# Patient Record
Sex: FEMALE | Race: WHITE | NOT HISPANIC OR LATINO | Employment: UNEMPLOYED | URBAN - METROPOLITAN AREA
[De-identification: names, ages, dates, MRNs, and addresses within clinical notes are randomized per-mention and may not be internally consistent; named-entity substitution may affect disease eponyms.]

---

## 2018-06-25 ENCOUNTER — APPOINTMENT (OUTPATIENT)
Dept: RADIOLOGY | Facility: IMAGING CENTER | Age: 23
End: 2018-06-25
Attending: PHYSICIAN ASSISTANT
Payer: COMMERCIAL

## 2018-06-25 ENCOUNTER — OCCUPATIONAL MEDICINE (OUTPATIENT)
Dept: URGENT CARE | Facility: CLINIC | Age: 23
End: 2018-06-25
Payer: COMMERCIAL

## 2018-06-25 VITALS
HEART RATE: 88 BPM | WEIGHT: 173 LBS | HEIGHT: 69 IN | BODY MASS INDEX: 25.62 KG/M2 | RESPIRATION RATE: 16 BRPM | OXYGEN SATURATION: 100 % | TEMPERATURE: 98.9 F | SYSTOLIC BLOOD PRESSURE: 128 MMHG | DIASTOLIC BLOOD PRESSURE: 84 MMHG

## 2018-06-25 DIAGNOSIS — S69.92XA INJURY OF LEFT HAND, INITIAL ENCOUNTER: ICD-10-CM

## 2018-06-25 DIAGNOSIS — S61.309A AVULSION OF FINGERNAIL, INITIAL ENCOUNTER: ICD-10-CM

## 2018-06-25 DIAGNOSIS — S63.259A DISLOCATION OF FINGER, INITIAL ENCOUNTER: ICD-10-CM

## 2018-06-25 DIAGNOSIS — R11.0 NAUSEA: ICD-10-CM

## 2018-06-25 DIAGNOSIS — S62.653A CLOSED NONDISPLACED FRACTURE OF MIDDLE PHALANX OF LEFT MIDDLE FINGER, INITIAL ENCOUNTER: ICD-10-CM

## 2018-06-25 PROCEDURE — 99203 OFFICE O/P NEW LOW 30 MIN: CPT | Mod: 25,29 | Performed by: PHYSICIAN ASSISTANT

## 2018-06-25 PROCEDURE — 73130 X-RAY EXAM OF HAND: CPT | Mod: TC,LT,29 | Performed by: PHYSICIAN ASSISTANT

## 2018-06-25 PROCEDURE — 26770 TREAT FINGER DISLOCATION: CPT | Mod: 29,54,F1 | Performed by: PHYSICIAN ASSISTANT

## 2018-06-25 PROCEDURE — 73140 X-RAY EXAM OF FINGER(S): CPT | Mod: TC,LT,29 | Performed by: PHYSICIAN ASSISTANT

## 2018-06-25 RX ORDER — HYDROCODONE BITARTRATE AND ACETAMINOPHEN 5; 325 MG/1; MG/1
1 TABLET ORAL EVERY 8 HOURS PRN
Qty: 15 TAB | Refills: 0 | Status: SHIPPED | OUTPATIENT
Start: 2018-06-25 | End: 2018-06-30

## 2018-06-25 RX ORDER — ONDANSETRON 4 MG/1
4 TABLET, ORALLY DISINTEGRATING ORAL ONCE
Status: COMPLETED | OUTPATIENT
Start: 2018-06-25 | End: 2018-06-25

## 2018-06-25 RX ORDER — ACETAMINOPHEN 500 MG
1000 TABLET ORAL ONCE
Status: COMPLETED | OUTPATIENT
Start: 2018-06-25 | End: 2018-06-25

## 2018-06-25 RX ADMIN — ONDANSETRON 4 MG: 4 TABLET, ORALLY DISINTEGRATING ORAL at 16:39

## 2018-06-25 RX ADMIN — Medication 1000 MG: at 14:39

## 2018-06-25 NOTE — PROGRESS NOTES
"Subjective:      Stas Martinez is a 22 y.o. female who presents with Finger Injury (left index finger crush injury today )      DOI: 6/25/18. Left hand injury. Patient's hand was injured while using a torque on and bent her left index finger causing injury. There is an obvious deformity to her left index finger. There is a laceration at the ventral DIP joint. The nail of the left index finger has been avulsed. Significant tenderness and swelling. Pain extends into the left hand. Patient was evaluated by EMT at work, they splinted her and sent her here. No previous injury. No 2nd job. Tetanus UTD.     HPI    ROS    Medications, Allergies, and current problem list reviewed today in Epic     Objective:     /84   Pulse 88   Temp 37.2 °C (98.9 °F)   Resp 16   Ht 1.753 m (5' 9\")   Wt 78.5 kg (173 lb)   SpO2 100%   BMI 25.55 kg/m²      Physical Exam   Constitutional: She is oriented to person, place, and time. She appears well-developed and well-nourished. No distress.   HENT:   Head: Normocephalic and atraumatic.   Eyes: Conjunctivae are normal.   Neck: Normal range of motion. Neck supple.   Cardiovascular: Normal rate, regular rhythm and normal heart sounds.    Pulmonary/Chest: Effort normal and breath sounds normal. No respiratory distress. She has no wheezes.   Musculoskeletal: She exhibits edema, tenderness and deformity.   Neurological: She is alert and oriented to person, place, and time.   Skin: Skin is warm and dry. She is not diaphoretic.   Psychiatric: She has a normal mood and affect. Her behavior is normal. Judgment and thought content normal.   Nursing note and vitals reviewed.      There is an obvious deformity to her left index finger. There is a superficial laceration at the ventral DIP joint. The nail of the left index finger has been avulsed. Significant tenderness and swelling. Pain extends into the left hand. There is tenderness to the PIP joint of the third digit of the left hand.  Using " sterile technique the area was cleaned. Local anesthesia with 2% lidocaine without. Traction was applied and finger was reduced. Postreduction films showed good joint alignment. Range of motion was normal. Finger was then soaked, cleaned, clean dressings applied. Finger splints for the second and third digit.    Prereduction film: Dislocation of the DIP joint secondary to left hand. Avulsion fracture of the PIP joint third digit left hand.    Postreduction film: No further dislocation noted proper alignment.    Radiology review pending     Assessment/Plan:     1. Injury of left hand, initial encounter  DX-HAND 3+ LEFT    acetaminophen (TYLENOL) tablet 1,000 mg   2. Nausea  ondansetron (ZOFRAN ODT) dispertab 4 mg   3. Dislocation of finger, initial encounter  DX-FINGER(S) 2+ LEFT    HYDROcodone-acetaminophen (NORCO) 5-325 MG Tab per tablet    Consent for Opiate Prescription   4. Closed nondisplaced fracture of middle phalanx of left middle finger, initial encounter  HYDROcodone-acetaminophen (NORCO) 5-325 MG Tab per tablet    Consent for Opiate Prescription   5. Avulsion of fingernail, initial encounter  HYDROcodone-acetaminophen (NORCO) 5-325 MG Tab per tablet    Consent for Opiate Prescription     X-ray showed a dislocation of the second DIP joint. Finger was reduced. Postreduction films showed proper alignment. There is also a fracture of the third PIP joints.  Norco for breakthrough pain. Do not take at work. OTC meds for daytime  [facility] Aware web site evaluation: I have obtained and reviewed patient utilization report from [facility] pharmacy database prior to writing prescription for controlled substance II, III or IV. Based on the report and my clinical assessment the prescription is medically necessary.   Patient is cautioned on sedation potential of narcotic medication; no drinking, driving or operating heavy machinery while on this medication.  Wounds were cleaned, fresh dressings applied. Finger  splints  Tetanus up-to-date  Wound care discussed, handout given. Watch for infection  Follow-up 3 days  Work restrictions  Return to clinic or go to ED if symptoms worsen or persist. Indications for ED discussed at length. Patient voices understanding. Red flags discussed.All side effects of medication discussed including allergic response, GI upset, tendon injury, etc.      Please note that this dictation was created using voice recognition software. I have made every reasonable attempt to correct obvious errors, but I expect that there are errors of grammar and possibly content that I did not discover before finalizing the note.

## 2018-06-25 NOTE — LETTER
"EMPLOYEE’S CLAIM FOR COMPENSATION/ REPORT OF INITIAL TREATMENT  FORM C-4    EMPLOYEE’S CLAIM - PROVIDE ALL INFORMATION REQUESTED   First Name  Stas Last Name  Juan Birthdate                    1995                Sex  female Claim Number   Home Address  [address] Age  22 y.o. Height  1.753 m (5' 9\") Weight  78.5 kg (173 lb) [facility]     [place]  [address] Telephone  [phone] (home)    Mailing Address  [address] [place]  [address] Primary Language Spoken  English    Insurer   Third Party      Employee's Occupation (Job Title) When Injury or Occupational Disease Occurred      Employer's Name     Telephone      Employer Address    City    State    Zip      Date of Injury  6/25/2018               Hour of Injury  1:50 PM Date Employer Notified  6/25/2018 Last Day of Work after Injury or Occupational Disease  6/25/2018 Supervisor to Whom Injury Reported  King Ritter   Address or Location of Accident (if applicable)  [[address]]   What were you doing at the time of accident? (if applicable)  [employer]    How did this injury or occupational disease occur? (Be specific an answer in detail. Use additional sheet if necessary)  Work glove got caught in torgue gun drill   If you believe that you have an occupational disease, when did you first have knowledge of the disability and it relationship to your employment?  no Witnesses to the Accident  no      Nature of Injury or Occupational Disease  Workers' Compensation  Part(s) of Body Injured or Affected  Hand (L), ,     I certify that the above is true and correct to the best of my knowledge and that I have provided this information in order to obtain the benefits of [place]  I hereby " authorize any physician, chiropractor, surgeon, practitioner, or other person, any hospital, including [facility] or [facility], any medical service organization, any insurance company, or other institution or organization to release to each other, any medical or other information, including benefits paid or payable, pertinent to this injury or disease, except information relative to diagnosis, treatment and/or counseling for AIDS, psychological conditions, alcohol or controlled substances, for which I must give specific authorization.  A Photostat of this authorization shall be as valid as the original.     Date 06/25/2018   Place [facility] [place]   Employee’s Signature   THIS REPORT MUST BE COMPLETED AND MAILED WITHIN 3 WORKING DAYS OF TREATMENT   Place  [facility]  Name of Facility  [facility]   Date  6/25/2018 Diagnosis  (S69.92XA) Injury of left hand, initial encounter  (R11.0) Nausea  (S63.259A) Dislocation of finger, initial encounter  (S62.653A) Closed nondisplaced fracture of middle phalanx of left middle finger, initial encounter  (S61.309A) Avulsion of fingernail, initial encounter Is there evidence the injured employee was under the influence of alcohol and/or another controlled substance at the time of accident?   Hour  2:27 PM Description of Injury or Disease  Diagnoses of Injury of left hand, initial encounter, Nausea, Dislocation of finger, initial encounter, Closed nondisplaced fracture of middle phalanx of left middle finger, initial encounter, and Avulsion of fingernail, initial encounter were pertinent to this visit. No   Treatment  X-ray showed a dislocation of the second DIP joint. Finger was reduced. Postreduction films showed proper alignment. There is also a fracture of the third PIP joints.  Norco for breakthrough pain. Do not take at work. OTC meds for daytime  Wounds were cleaned, fresh dressings applied. Finger splints  Tetanus up-to-date  Wound  "care discussed, handout given. Watch for infection  Follow-up 3 days  Have you advised the patient to remain off work five days or more? No   X-Ray Findings  Positive  Comments:fracture of the third PIP joint. Dislocation of the second DIP joint.   If Yes   From Date  To Date      From information given by the employee, together with medical evidence, can you directly connect this injury or occupational disease as job incurred?  Yes If No Full Duty  No Modified Duty  Yes   Is additional medical care by a physician indicated?  Yes If Modified Duty, Specify any Limitations / Restrictions  No pushing or pulling with left hand  Weight limit 10 pounds left hand   Do you know of any previous injury or disease contributing to this condition or occupational disease?                            No   Date  6/25/2018 Print Doctor’s Name Tarik Nuñez P.A.-C. I certify the employer’s copy of  this form was mailed on:   [place]  [address] Insurer’s Use Only     [place] Zip  [address]    Provider’s Tax ID Number  [id number] Telephone  Dept: [phone]        e-TARIK Sims P.A.-C.   e-Signature: Dr. Carlos Manuel Ramos, Medical Director Degree  OL[employer]        ORIGINAL-TREATING PHYSICIAN OR CHIROPRACTOR    PAGE 2-INSURER/TPA    PAGE 3-EMPLOYER    PAGE 4-EMPLOYEE             Form C-4 (rev10/07)              BRIEF DESCRIPTION OF RIGHTS AND BENEFITS  (Pursuant to [place] 616C.050)    Notice of Injury or Occupational Disease (Incident Report Form C-1): If an injury or occupational disease (OD) arises out of and in the  course of employment, you must provide written notice to your employer as soon as practicable, but no later than 7 days after the accident or  OD. Your employer shall maintain a sufficient supply of the required forms.    Claim for Compensation (Form C-4): If medical treatment is sought, the form C-4 is available at the place of initial treatment. A completed  \"Claim for Compensation\" (Form " C-4) must be filed within 90 days after an accident or OD. The treating physician or chiropractor must,  within 3 working days after treatment, complete and mail to the employer, the employer's insurer and third-party , the Claim for  Compensation.    Medical Treatment: If you require medical treatment for your on-the-job injury or OD, you may be required to select a physician or  chiropractor from a list provided by your workers’ compensation insurer, if it has contracted with an Organization for Managed Care (MCO) or  Preferred Provider Organization (PPO) or providers of health care. If your employer has not entered into a contract with an MCO or PPO, you  may select a physician or chiropractor from the Panel of Physicians and Chiropractors. Any medical costs related to your industrial injury or  OD will be paid by your insurer.    Temporary Total Disability (TTD): If your doctor has certified that you are unable to work for a period of at least 5 consecutive days, or 5  cumulative days in a 20-day period, or places restrictions on you that your employer does not accommodate, you may be entitled to TTD  compensation.    Temporary Partial Disability (TPD): If the wage you receive upon reemployment is less than the compensation for TTD to which you are  entitled, the insurer may be required to pay you TPD compensation to make up the difference. TPD can only be paid for a maximum of 24  months.    Permanent Partial Disability (PPD): When your medical condition is stable and there is an indication of a PPD as a result of your injury or  OD, within 30 days, your insurer must arrange for an evaluation by a rating physician or chiropractor to determine the degree of your PPD. The  amount of your PPD award depends on the date of injury, the results of the PPD evaluation and your age and wage.    Permanent Total Disability (PTD): If you are medically certified by a treating physician or chiropractor as  permanently and totally disabled  and have been granted a PTD status by your insurer, you are entitled to receive monthly benefits not to exceed 66 2/3% of your average  monthly wage. The amount of your PTD payments is subject to reduction if you previously received a PPD award.    Vocational Rehabilitation Services: You may be eligible for vocational rehabilitation services if you are unable to return to the job due to a  permanent physical impairment or permanent restrictions as a result of your injury or occupational disease.    Transportation and Per Stefan Reimbursement: You may be eligible for travel expenses and per stefan associated with medical treatment.    Reopening: You may be able to reopen your claim if your condition worsens after claim closure.    Appeal Process: If you disagree with a written determination issued by the insurer or the insurer does not respond to your request, you may  appeal to the Department of Administration, , by following the instructions contained in your determination letter. You must  appeal the determination within 70 days from the date of the determination letter at [address], [address], [place]  [address], or [address], [place] [address]. If you disagree with the  decision, you may appeal to the  Department of Administration, . You must file your appeal within 30 days from the date of the  decision  letter at [address], [address], [place], [place] [address], or [address], [place], [place] [address]. If you  disagree with a decision of an , you may file a petition for judicial review with the District Court. You must do so within 30  days of the Appeal Officer’s decision. You may be represented by an  at your own expense or you may contact the [facility] for possible  representation.    [place]  for Injured Workers  (NAIW): If you disagree with a  decision, you may request that NAIW represent you  without charge at an  Hearing. For information regarding denial of benefits, you may contact the NA at: [address], [address], [place] [address], [phone], or [address], [address], [place] [address], [phone]    To File a Complaint with the Division: If you wish to file a complaint with the  of the Division of Industrial Relations (DIR),  please contact the Workers’ Compensation Section, [address], [address], [place], [place] [address], telephone [phone]) [phone], or  [address], [address], [place] [address], telephone [phone].    For assistance with Workers’ Compensation Issues: you may contact the Office of the Governor Consumer Health Assistance, [address] [place] [address], [address], [place], [place] [address], Toll Free 1-415.945.6170, Web site: http://govcha.[facility].nv., E-mail  Sandra@govcha.[facility].nv.                                                                                                                                                                                                                                   __________________________________________________________________                                                                   ____06/25/2018_____________                Employee Name / Signature                                                                                                                                                       Date                                                                                                                                                                                                     D-2 (rev. 10/07)

## 2018-06-25 NOTE — LETTER
[facility]  [address] [place] [address]  Phone:  [phone] - Fax:  [phone]   [facility] Progress Report and Disability Certification  Date of Service: 6/25/2018   No Show:  No  Date / Time of Next Visit: 6/29/2018   Claim Information   Patient Name: Stas Martinez  Claim Number:     Employer:    Date of Injury: 6/25/2018     Insurer / TPA:  ID / SSN:     Occupation:   Diagnosis: Diagnoses of Injury of left hand, initial encounter, Nausea, Dislocation of finger, initial encounter, Closed nondisplaced fracture of middle phalanx of left middle finger, initial encounter, and Avulsion of fingernail, initial encounter were pertinent to this visit.    Medical Information   Related to Industrial Injury? Yes    Subjective Complaints:  DOI: 6/25/18. Left hand injury. Patient's hand was injured while using a torque on and bent her left index finger causing injury. There is an obvious deformity to her left index finger. There is a laceration at the ventral DIP joint. The nail of the left index finger has been avulsed. Significant tenderness and swelling. Pain extends into the left hand. Patient was evaluated by EMT at work, they splinted her and sent her here. No previous injury. No 2nd job. Tetanus UTD.   Objective Findings: There is an obvious deformity to her left index finger. There is a superficial laceration at the ventral DIP joint. The nail of the left index finger has been avulsed. Significant tenderness and swelling. Pain extends into the left hand. There is tenderness to the PIP joint of the third digit of the left hand.  Using sterile technique the area was cleaned. Local anesthesia with 2% lidocaine without. Traction was applied and finger was reduced. Postreduction films showed good joint alignment. Range of motion was normal. Finger was then soaked, cleaned, clean dressings applied. Finger splints for the second and third  digit.   Pre-Existing Condition(s): None   Assessment:   Initial Visit    Status: Additional Care Required  Permanent Disability:No    Plan: Medication (NOT at Work)  Comments:Norco at night    Diagnostics: X-ray  Comments:dislocation of second DIP joint. Fracture of the third PIP joint.    Comments:  Pre-and post reduction films taken    Disability Information   Status: Released to Restricted Duty    From:  2018  Through: 2018 Restrictions are: Temporary   Physical Restrictions   Sitting:    Standing:    Stooping:    Bending:      Squatting:    Walking:    Climbing:    Pushin hrs/day   Pullin hrs/day Other:    Reaching Above Shoulder (L):   Reaching Above Shoulder (R):       Reaching Below Shoulder (L):    Reaching Below Shoulder (R):      Not to exceed Weight Limits   Carrying(hrs):   Weight Limit(lb): < or = to 10 pounds Lifting(hrs):   Weight  Limit(lb): < or = to 10 pounds   Comments:      Repetitive Actions   Hands: i.e. Fine Manipulations from Grasping:     Feet: i.e. Operating Foot Controls:     Driving / Operate Machinery:     Physician Name: Tarik Nuñez P.A.-C. Physician Signature: TARIK Skaggs P.A.-C. e-Signature: Dr. Carlos Manuel Ramos, Medical Director   Clinic Name / Location: [address], [address] [place], [place] [id number] Clinic Phone Number: Dept: [phone]   Appointment Time: 2:20 Pm Visit Start Time: 2:27 PM   Check-In Time:  2:23 Pm Visit Discharge Time:  3:53 pm   Original-Treating Physician or Chiropractor    Page 2-Insurer/TPA    Page 3-Employer    Page 4-Employee

## 2018-06-29 ENCOUNTER — OCCUPATIONAL MEDICINE (OUTPATIENT)
Dept: URGENT CARE | Facility: CLINIC | Age: 23
End: 2018-06-29
Payer: COMMERCIAL

## 2018-06-29 VITALS
OXYGEN SATURATION: 99 % | HEIGHT: 69 IN | WEIGHT: 173 LBS | RESPIRATION RATE: 16 BRPM | BODY MASS INDEX: 25.62 KG/M2 | HEART RATE: 98 BPM | TEMPERATURE: 98.1 F | SYSTOLIC BLOOD PRESSURE: 112 MMHG | DIASTOLIC BLOOD PRESSURE: 74 MMHG

## 2018-06-29 DIAGNOSIS — S62.651D CLOSED NONDISPLACED FRACTURE OF MIDDLE PHALANX OF LEFT INDEX FINGER WITH ROUTINE HEALING, SUBSEQUENT ENCOUNTER: ICD-10-CM

## 2018-06-29 DIAGNOSIS — S61.309A AVULSION OF FINGERNAIL OF LEFT HAND: ICD-10-CM

## 2018-06-29 DIAGNOSIS — S63.259D FINGER DISLOCATION, SUBSEQUENT ENCOUNTER: ICD-10-CM

## 2018-06-29 PROCEDURE — 99213 OFFICE O/P EST LOW 20 MIN: CPT | Performed by: PHYSICIAN ASSISTANT

## 2018-06-29 RX ORDER — IBUPROFEN 200 MG
200 TABLET ORAL EVERY 6 HOURS PRN
COMMUNITY
End: 2020-02-21

## 2018-06-29 ASSESSMENT — ENCOUNTER SYMPTOMS
CHILLS: 0
FEVER: 0
TINGLING: 1
SENSORY CHANGE: 0
FOCAL WEAKNESS: 0
MYALGIAS: 0

## 2018-06-29 NOTE — PROGRESS NOTES
"Subjective:      Stas Martinez is a 22 y.o. female who presents with Finger Pain (left index and middle finger pain better since last visit but still sore )      DOI: 6/25/18. Patient is Phere for a follow-up on a left middle finger middle phalanx fracture and dislocation of second DIP joint with fingernail avulsion.  Her dislocation was reduced at her original visit. Patient states she is doing better. She reports 3/10 pain. She has noticed continued swelling in the 2nd and 3rd digit with some tingling that extends up her left arm. She has been using her pain medication mostly at night. She does not feel like she needs more. She states Ibuprofen has been keeping her pain controlled. She still has limitation with flexion due to pain and swelling. She has been keeping the 2nd and 3rd fingers in a brace. She denies increased redness around the avulsion site, fever, or chills.     HPI    No past medical history on file.    No past surgical history on file.    No family history on file.    No Known Allergies    Medications, Allergies, and current problem list reviewed today in Epic    Review of Systems   Constitutional: Negative for chills, fever and malaise/fatigue.   Musculoskeletal: Positive for joint pain. Negative for myalgias.   Skin:        Avulsion on left 2nd fingernail   Neurological: Positive for tingling. Negative for sensory change and focal weakness.       All other systems reviewed and are negative.      Objective:     /74   Pulse 98   Temp 36.7 °C (98.1 °F)   Resp 16   Ht 1.753 m (5' 9\")   Wt 78.5 kg (173 lb)   SpO2 99%   BMI 25.55 kg/m²      Physical Exam   Constitutional: She is oriented to person, place, and time. She appears well-developed and well-nourished. No distress.   Pulmonary/Chest: Effort normal. No respiratory distress.   Neurological: She is alert and oriented to person, place, and time. No cranial nerve deficit.   Psychiatric: She has a normal mood and affect. Her behavior is " normal. Judgment and thought content normal.       Vitals reviewed.   General: Patient A&O x 3. NAD  Left hand: diffuse moderated edema of both 2nd and 3rd digit. 2nd digit with fingernail avulsion- nail bed healing well without erythema, edema or pus. Limited ROM with flexion of 2nd and 3rd digit due to pain. Distal n/v intact in 2nd, 3rd and 4th digits with brisk capillary refill.        Assessment/Plan:     1. Closed nondisplaced fracture of middle phalanx of left index finger with routine healing, subsequent encounter    2. Avulsion of fingernail of left hand    3. Finger dislocation, subsequent encounter    OTC NSAIDS  Continue current restrictions.  Keep wound covered and clean.   Continue to wear finger splints.  Follow-up in 6 days.     Differential diagnoses, Supportive care, and indications for immediate follow-up discussed with patient.   Instructed to return to clinic or nearest emergency department for any change in condition, further concerns, or worsening of symptoms.    The patient demonstrated a good understanding and agreed with the treatment plan.    Marley Mack P.A.-C.

## 2018-06-29 NOTE — LETTER
[facility]  [address] [facility], [address] [place] [address]  Phone:  [phone] - Fax:  [phone]   [facility] Progress Report and Disability Certification  Date of Service: 6/29/2018   No Show:  No  Date / Time of Next Visit: 7/5/2018   Claim Information   Patient Name: Stas Martinez  Claim Number:     Employer:    Date of Injury: 6/25/2018     Insurer / TPA:  ID / SSN:     Occupation:   Diagnosis: Diagnoses of Closed nondisplaced fracture of middle phalanx of left index finger with routine healing, subsequent encounter, Avulsion of fingernail of left hand, and Finger dislocation, subsequent encounter were pertinent to this visit.    Medical Information   Related to Industrial Injury? Yes    Subjective Complaints:  DOI: 6/25/18. Patient is [place] for a follow-up on a left middle finger middle phalanx fracture and dislocation of second DIP joint with fingernail avulsion.  Her dislocation was reduced at her original visit. Patient states she is doing better. She reports 3/10 pain. She has noticed continued swelling in the 2nd and 3rd digit with some tingling that extends up her left arm. She has been using her pain medication mostly at night. She does not feel like she needs more. She states Ibuprofen has been keeping her pain controlled. She still has limitation with flexion due to pain and swelling. She has been keeping the 2nd and 3rd fingers in a brace. She denies increased redness around the avulsion site, fever, or chills.   Objective Findings: Vitals reviewed.   General: Patient A&O x 3. NAD  Left hand: diffuse moderated edema of both 2nd and 3rd digit. 2nd digit with fingernail avulsion- nail bed healing well without erythema, edema or pus. Limited ROM with flexion of 2nd and 3rd digit due to pain. Distal n/v intact in 2nd, 3rd and 4th digits with brisk capillary refill.    Pre-Existing Condition(s):     Assessment:   Condition Improved       Status: Additional Care Required  Permanent Disability:No    Plan: Medication  Comments:Wound care discussed. OTC Ibuprofen. Patient refused refill for pain medication. Keep 2nd and 3rd digit in brace and covered.    Diagnostics:      Comments:       Disability Information   Status: Released to Restricted Duty    From:  2018  Through: 2018 Restrictions are: Temporary   Physical Restrictions   Sitting:    Standing:    Stooping:    Bending:      Squatting:    Walking:    Climbing:    Pushin hrs/day   Pullin hrs/day Other:    Reaching Above Shoulder (L):   Reaching Above Shoulder (R):       Reaching Below Shoulder (L):    Reaching Below Shoulder (R):      Not to exceed Weight Limits   Carrying(hrs):   Weight Limit(lb): < or = to 10 pounds Lifting(hrs):   Weight  Limit(lb): < or = to 10 pounds   Comments:      Repetitive Actions   Hands: i.e. Fine Manipulations from Grasping:     Feet: i.e. Operating Foot Controls:     Driving / Operate Machinery:     Physician Name: Mitchell Mack P.A.-C. Physician Signature: MITCHELL Gross P.A.-C. e-Signature: Dr. Carlos Manuel Ramos, Medical Director   Clinic Name / Location: [address], [address]  [facility] [address] Clinic Phone Number: Dept: [phone]   Appointment Time: 8:30 Am Visit Start Time: 8:25 AM   Check-In Time:  8:21 Am Visit Discharge Time:  8:46 am   Original-Treating Physician or Chiropractor    Page 2-Insurer/TPA    Page 3-Employer    Page 4-Employee

## 2018-07-02 ENCOUNTER — TELEPHONE (OUTPATIENT)
Dept: URGENT CARE | Facility: CLINIC | Age: 23
End: 2018-07-02

## 2018-07-02 NOTE — TELEPHONE ENCOUNTER
Please notify patient that PAtient will need to be re-evaluated with a follow-up visit in person since she refused pain medication at last visit. Counseling in person needs to be done and opiate risk form needs to be signed, IN PERSON.  Marley Mack P.A.-C.

## 2018-07-02 NOTE — TELEPHONE ENCOUNTER
----- Message from Sharon Sigala, Med Ass't sent at 7/2/2018  9:00 AM PDT -----  Regarding: Medications  Patient called and is wanting Norco for the pain. Please advise the next step. [facility] appointment on 07/05/2018

## 2018-07-05 ENCOUNTER — OCCUPATIONAL MEDICINE (OUTPATIENT)
Dept: URGENT CARE | Facility: CLINIC | Age: 23
End: 2018-07-05
Payer: COMMERCIAL

## 2018-07-05 VITALS
HEART RATE: 76 BPM | RESPIRATION RATE: 14 BRPM | SYSTOLIC BLOOD PRESSURE: 129 MMHG | BODY MASS INDEX: 24.73 KG/M2 | OXYGEN SATURATION: 99 % | WEIGHT: 167 LBS | DIASTOLIC BLOOD PRESSURE: 87 MMHG | TEMPERATURE: 98.8 F | HEIGHT: 69 IN

## 2018-07-05 DIAGNOSIS — S61.309A AVULSION OF FINGERNAIL OF LEFT HAND: ICD-10-CM

## 2018-07-05 DIAGNOSIS — S62.653D CLOSED NONDISPLACED FRACTURE OF MIDDLE PHALANX OF LEFT MIDDLE FINGER WITH ROUTINE HEALING, SUBSEQUENT ENCOUNTER: ICD-10-CM

## 2018-07-05 PROCEDURE — 99213 OFFICE O/P EST LOW 20 MIN: CPT | Mod: 29 | Performed by: PHYSICIAN ASSISTANT

## 2018-07-05 ASSESSMENT — ENCOUNTER SYMPTOMS
FEVER: 0
CHILLS: 0
TINGLING: 0
FOCAL WEAKNESS: 0
SENSORY CHANGE: 0

## 2018-07-05 ASSESSMENT — PAIN SCALES - GENERAL: PAINLEVEL: 4=SLIGHT-MODERATE PAIN

## 2018-07-05 NOTE — LETTER
[facility]  [address] [facility], [address] [place] [address]  Phone:  [phone] - Fax:  [phone]   [facility] Progress Report and Disability Certification  Date of Service: 7/5/2018   No Show:  No  Date / Time of Next Visit: 7/19/2018   Claim Information   Patient Name: Stas Martinez  Claim Number:     Employer:   [employer] Date of Injury: 6/25/2018     Insurer / TPA: Mike Schneider  ID / SSN:     Occupation:   Diagnosis: Diagnoses of Closed nondisplaced fracture of middle phalanx of left middle finger with routine healing, subsequent encounter and Avulsion of fingernail of left hand were pertinent to this visit.    Medical Information   Related to Industrial Injury?      Subjective Complaints:  DOI: 6/25/18. Patient is here for a follow-up on her left middle finger fracture, dislocation and nail avulsion injury of left index finger. Patient states her pain has improved and her nail bed is healing. She denies fever, chills, redness, or swelling. No purulent drainage or pus. Patient is still having difficulty with moving her left index and left middle finger. Her fingers have been kept in a brace. She denies numbness or tingling. She is right handed. Ibuprofen is managing her pain.   Objective Findings: Vitals reviewed.  Left Hand: Marked limitation in ROM of left index and left middle finger due to pain. No erythema or edema. Some ecchymosis of left middle finger. Nail bed of left index finger healing without erythema, edema, purulent drainage or pus. Distal n/v intact and capillary refill < 2 seconds of left index and left middle finger.   Pre-Existing Condition(s):     Assessment:   Condition Improved    Status: Additional Care Required  Permanent Disability:No    Plan: Medication  Comments:Keep fingers in braces. Wound care. OTC IBUPROFEN for pain     Diagnostics:      Comments:       Disability Information   Status: Released to  Restricted Duty    From:  7/5/2018  Through: 7/19/2018 Restrictions are: Temporary   Physical Restrictions   Sitting:    Standing:    Stooping:    Bending:      Squatting:    Walking:    Climbing:    Pushing:      Pulling:    Other:    Reaching Above Shoulder (L):   Reaching Above Shoulder (R):       Reaching Below Shoulder (L):    Reaching Below Shoulder (R):      Not to exceed Weight Limits   Carrying(hrs):   Weight Limit(lb): < or = to 10 pounds Lifting(hrs):   Weight  Limit(lb): < or = to 10 pounds   Comments: Patient unable to moved left index and left middle finger. Follow-up in 2 weeks.    Repetitive Actions   Hands: i.e. Fine Manipulations from Grasping:     Feet: i.e. Operating Foot Controls:     Driving / Operate Machinery:     Physician Name: Mitchell Mack P.A.-C. Physician Signature: MITCHELL Gross P.A.-C. e-Signature: Dr. Carlos Manuel Ramos, Medical Director   Clinic Name / Location: [address], [address] [address] Clinic Phone Number: Dept: [phone]   Appointment Time: 8:30 Am Visit Start Time: 8:48 AM   Check-In Time:  8:30 Am Visit Discharge Time:  9:11 am   Original-Treating Physician or Chiropractor    Page 2-Insurer/TPA    Page 3-Employer    Page 4-Employee

## 2018-07-05 NOTE — PROGRESS NOTES
"Subjective:      Stas Martinez is a 22 y.o. female who presents with Finger Injury (follow up)      DOI: 6/25/18. Patient is here for a follow-up on her left middle finger fracture, dislocation and nail avulsion injury of left index finger. Patient states her pain has improved and her nail bed is healing. She denies fever, chills, redness, or swelling. No purulent drainage or pus. Patient is still having difficulty with moving her left index and left middle finger. Her fingers have been kept in a brace. She denies numbness or tingling. She is right handed. Ibuprofen is managing her pain.     HPI    No past medical history on file.    No past surgical history on file.    No family history on file.  No Known Allergies    Medications, Allergies, and current problem list reviewed today in Epic      Review of Systems   Constitutional: Negative for chills, fever and malaise/fatigue.   Musculoskeletal: Positive for joint pain (left hand- middle finger and left index finger pain ).   Neurological: Negative for tingling, sensory change and focal weakness.       All other systems reviewed and are negative.        Objective:     /87   Pulse 76   Temp 37.1 °C (98.8 °F)   Resp 14   Ht 1.76 m (5' 9.29\")   Wt 75.8 kg (167 lb)   LMP 06/29/2018   SpO2 99%   BMI 24.45 kg/m²      Physical Exam   Constitutional: She is oriented to person, place, and time. She appears well-developed and well-nourished. No distress.   Pulmonary/Chest: Effort normal. No respiratory distress.   Neurological: She is alert and oriented to person, place, and time.   Skin: Skin is warm and dry. No rash noted.   Psychiatric: She has a normal mood and affect. Her behavior is normal. Judgment and thought content normal.       Vitals reviewed.  Left Hand: Marked limitation in ROM of left index and left middle finger due to pain. No erythema or edema. Some ecchymosis of left middle finger. Nail bed of left index finger healing without erythema, edema, " purulent drainage or pus. Distal n/v intact and capillary refill < 2 seconds of left index and left middle finger.       Assessment/Plan:     1. Closed nondisplaced fracture of middle phalanx of left middle finger with routine healing, subsequent encounter    2. Avulsion of fingernail of left hand    Continue current restrictions.  Wound care discussed.  Keep wound clean and covered.  Keep fingers in braces.  Follow-up in 2-3 weeks or sooner prn  Continue OTC NSAIDS.     Differential diagnoses, Supportive care, and indications for immediate follow-up discussed with patient.   Instructed to return to clinic or nearest emergency department for any change in condition, further concerns, or worsening of symptoms.    The patient demonstrated a good understanding and agreed with the treatment plan.    Marley Mack P.A.-C.

## 2018-07-19 ENCOUNTER — OCCUPATIONAL MEDICINE (OUTPATIENT)
Dept: URGENT CARE | Facility: CLINIC | Age: 23
End: 2018-07-19
Payer: COMMERCIAL

## 2018-07-19 VITALS
RESPIRATION RATE: 16 BRPM | HEART RATE: 80 BPM | BODY MASS INDEX: 24.73 KG/M2 | TEMPERATURE: 98.4 F | SYSTOLIC BLOOD PRESSURE: 110 MMHG | HEIGHT: 69 IN | DIASTOLIC BLOOD PRESSURE: 72 MMHG | WEIGHT: 167 LBS | OXYGEN SATURATION: 100 %

## 2018-07-19 DIAGNOSIS — M25.642 FINGER STIFFNESS, LEFT: ICD-10-CM

## 2018-07-19 DIAGNOSIS — S63.259D DISLOCATION OF FINGER, SUBSEQUENT ENCOUNTER: ICD-10-CM

## 2018-07-19 DIAGNOSIS — S61.309D AVULSION OF FINGERNAIL, SUBSEQUENT ENCOUNTER: ICD-10-CM

## 2018-07-19 DIAGNOSIS — S62.653D CLOSED NONDISPLACED FRACTURE OF MIDDLE PHALANX OF LEFT MIDDLE FINGER WITH ROUTINE HEALING, SUBSEQUENT ENCOUNTER: ICD-10-CM

## 2018-07-19 PROCEDURE — 99213 OFFICE O/P EST LOW 20 MIN: CPT | Mod: 29 | Performed by: NURSE PRACTITIONER

## 2018-07-19 ASSESSMENT — ENCOUNTER SYMPTOMS
SENSORY CHANGE: 0
TINGLING: 0

## 2018-07-19 NOTE — LETTER
[facility]  [address] [facility], [address] [place] [address]  Phone:  [phone] - Fax:  [phone]   [facility] Progress Report and Disability Certification  Date of Service: 7/19/2018   No Show:  No  Date / Time of Next Visit: 8/9/2018   Claim Information   Patient Name: Stas Martinez  Claim Number:     Employer:   [employer] Date of Injury: 6/25/2018     Insurer / TPA: [employer]  ID / SSN:     Occupation:   Diagnosis: Diagnoses of Closed nondisplaced fracture of middle phalanx of left middle finger with routine healing, subsequent encounter, Avulsion of fingernail, subsequent encounter, Dislocation of finger, subsequent encounter, and Finger stiffness, left were pertinent to this visit.    Medical Information   Related to Industrial Injury? Yes    Subjective Complaints:  DOI: 6/25/18. 4th visit.  Patient is here for a follow-up on her left middle finger fracture, dislocation and nail avulsion injury of left index finger. Patient states her pain has improved and her nail bed is healing. She denies fever, chills, redness, or swelling. No purulent drainage or pus. Patient is still having difficulty with moving her left index and left middle finger. Her fingers have been kept in a brace. She does not increased pain with any attempted lifting or ROM.  She denies numbness or tingling. She is right handed.  She is not taking any medication for pain.  No pain presently; at times pain is a 2/10.  Patient is not currently working as her employment contract ended.   Objective Findings: Patient is alert, oriented, and in no acute distress.  Blood pressure 110/72.  Heart rate, respiratory rate and effort regular.  Left Hand is warm, dry, and intact with no bruising, swelling, erythema, rash or lesion.  Sensation grossly intact to touch.  Marked limitation in ROM of left index and left middle finger with discomfort on attempted movement. Nail bed of  left index finger healing without erythema, edema, purulent drainage or pus. Distal n/v intact and capillary refill < 2 seconds of left index and left middle finger.   Pre-Existing Condition(s):     Assessment:   Condition Improved    Status: Discharged / Care Transfer  Comments:Follow up at occupatonal medicine in 2-3 weeks.  Referred to hand therapy for evaluation and care.    Permanent Disability:No    Plan: Medication  Comments:OTC NSAIDs prn pain.  TID remove finger splints and practice ROM.    Diagnostics:      Comments:       Disability Information   Status: Released to Restricted Duty    From:  7/19/2018  Through: 8/9/2018 Restrictions are: Temporary   Physical Restrictions   Sitting:    Standing:    Stooping:    Bending:      Squatting:    Walking:    Climbing:    Pushing:      Pulling:    Other:    Reaching Above Shoulder (L):   Reaching Above Shoulder (R):       Reaching Below Shoulder (L):    Reaching Below Shoulder (R):      Not to exceed Weight Limits   Carrying(hrs):   Weight Limit(lb): < or = to 10 pounds Lifting(hrs):   Weight  Limit(lb): < or = to 10 pounds   Comments: Wear finger splint on the left index and middle fingers daily.  This does limit use of the left hand for any fine hand manipulatives.     Repetitive Actions   Hands: i.e. Fine Manipulations from Grasping:     Feet: i.e. Operating Foot Controls:     Driving / Operate Machinery:     Physician Name: SILVIO Burt Physician Signature: STACEY Adamson e-Signature: Dr. Carlos Manuel Ramos, Medical Director   Clinic Name / Location: [address], [address]  [facility] [place] [address] Clinic Phone Number: Dept: [phone]   Appointment Time: 10:00 Am Visit Start Time: 9:44 AM   Check-In Time:  9:42 Am Visit Discharge Time:  10:13 AM   Original-Treating Physician or Chiropractor    Page 2-Insurer/TPA    Page 3-Employer    Page 4-Employee

## 2018-07-19 NOTE — PROGRESS NOTES
"Subjective:      Stas Martinez is a 23 y.o. female who presents with Finger Pain (left index and middle finger pain better since last visit )      DOI: 6/25/18. 4th visit.  Patient is here for a follow-up on her left middle finger fracture, dislocation and nail avulsion injury of left index finger. Patient states her pain has improved and her nail bed is healing. She denies fever, chills, redness, or swelling. No purulent drainage or pus. Patient is still having difficulty with moving her left index and left middle finger. Her fingers have been kept in a brace. She does not increased pain with any attempted lifting or ROM.  She denies numbness or tingling. She is right handed.  She is not taking any medication for pain.  No pain presently; at times pain is a 2/10.  Patient is not currently working as her employment contract ended.     HPI    Review of Systems   Musculoskeletal: Positive for joint pain.   Neurological: Negative for tingling and sensory change.     Medications, Allergies, and current problem list reviewed today in Epic     Objective:     /72   Pulse 80   Temp 36.9 °C (98.4 °F)   Resp 16   Ht 1.76 m (5' 9.29\")   Wt 75.8 kg (167 lb)   LMP 06/29/2018   SpO2 100%   BMI 24.46 kg/m²      Physical Exam    Patient is alert, oriented, and in no acute distress.  Blood pressure 110/72.  Heart rate, respiratory rate and effort regular.  Left Hand is warm, dry, and intact with no bruising, swelling, erythema, rash or lesion.  Sensation grossly intact to touch.  Marked limitation in ROM of left index and left middle finger with discomfort on attempted movement. Nail bed of left index finger healing without erythema, edema, purulent drainage or pus. Distal n/v intact and capillary refill < 2 seconds of left index and left middle finger.       Assessment/Plan:     1. Closed nondisplaced fracture of middle phalanx of left middle finger with routine healing, subsequent encounter  - REFERRAL TO HAND " THERAPY    2. Avulsion of fingernail, subsequent encounter    3. Dislocation of finger, subsequent encounter  - REFERRAL TO HAND THERAPY    4. Finger stiffness, left  - REFERRAL TO HAND THERAPY    Discussed case with Dr. Leonidas Kinsey; he concurs with plan of care.  OTC NSAIDs or tylenol prn pain.  Wear finger splints on index and middle fingers of the left hand.  Remove splints TID to practice ROM and reduce stiffness.  Follow up with Occupational medicine in 3 weeks, sooner if worse.  Referred to hand therapy for evaluation and care.  Restrictions per D-39.  Patient verbalized understanding of and agreed with plan of care.

## 2018-08-09 ENCOUNTER — APPOINTMENT (OUTPATIENT)
Dept: RADIOLOGY | Facility: IMAGING CENTER | Age: 23
End: 2018-08-09
Attending: PREVENTIVE MEDICINE
Payer: COMMERCIAL

## 2018-08-09 ENCOUNTER — OCCUPATIONAL MEDICINE (OUTPATIENT)
Dept: OCCUPATIONAL MEDICINE | Facility: CLINIC | Age: 23
End: 2018-08-09
Payer: COMMERCIAL

## 2018-08-09 VITALS
TEMPERATURE: 98.6 F | DIASTOLIC BLOOD PRESSURE: 68 MMHG | WEIGHT: 171.8 LBS | OXYGEN SATURATION: 99 % | HEIGHT: 69 IN | HEART RATE: 92 BPM | BODY MASS INDEX: 25.45 KG/M2 | SYSTOLIC BLOOD PRESSURE: 116 MMHG

## 2018-08-09 DIAGNOSIS — S63.259D CLOSED DISLOCATION OF FINGER OF LEFT HAND, SUBSEQUENT ENCOUNTER: ICD-10-CM

## 2018-08-09 DIAGNOSIS — S62.653G: ICD-10-CM

## 2018-08-09 PROCEDURE — 73130 X-RAY EXAM OF HAND: CPT | Mod: 26,LT,29 | Performed by: PHYSICIAN ASSISTANT

## 2018-08-09 PROCEDURE — 99204 OFFICE O/P NEW MOD 45 MIN: CPT | Performed by: PREVENTIVE MEDICINE

## 2018-08-09 NOTE — PROGRESS NOTES
"Subjective:      Stas Martinez is a 23 y.o. female who presents with Follow-Up ( DOI (06/25/2018) - lt Index & middle finger -same)      DOI: 6/25/18: 24 yo female here for a follow-up on her left middle finger fracture, dislocation and nail avulsion injury of left index finger. Her left hand got caught in torque gun drill. She was seen in , finger dislocation was reduced.  Patient states she continues to have pain at the PIP joint of the second and thrid digits.  She states that she is still unable to really move feels fingers.  Notes mild swelling.  Denies tingling or numbness.  She has been using the aluminum finger splints.  She is also been taking ibuprofen as needed for relief.     HPI    ROS  ROS: All systems were reviewed on intake form, form was reviewed and signed. See scanned documents in media. Pertinent positives and negatives included in HPI.    PMH: No pertinent past medical history to this problem  MEDS: Medications were reviewed in Epic  ALLERGIES: No Known Allergies  SOCHX: Works as a  at [employer]   FH: No pertinent family history to this problem     Objective:     /68   Pulse 92   Temp 37 °C (98.6 °F)   Ht 1.753 m (5' 9\")   Wt 77.9 kg (171 lb 12.8 oz)   SpO2 99%   BMI 25.37 kg/m²      Physical Exam   Constitutional: She is oriented to person, place, and time. She appears well-developed and well-nourished.   HENT:   Right Ear: External ear normal.   Left Ear: External ear normal.   Eyes: Conjunctivae and EOM are normal.   Cardiovascular: Normal rate.    Pulmonary/Chest: Effort normal.   Neurological: She is alert and oriented to person, place, and time.   Skin: Skin is warm and dry.   Psychiatric: She has a normal mood and affect.       Left hand: Mild swelling PIP joints of second and third digit.  Tender to palpation in the same area.  Very minimal flexion of the PIP joints.       Assessment/Plan:     1. Closed nondisplaced fracture of middle phalanx of left " middle finger with delayed healing, subsequent encounter  - DX-HAND 3+ LEFT; Future  - REFERRAL TO HAND SURGERY    2. Closed dislocation of finger of left hand, subsequent encounter  - DX-HAND 3+ LEFT; Future  - REFERRAL TO HAND SURGERY    XR Left Hand: No acute deformity as read by me  Referral to hand therapy approved but no appointment has been scheduled  Referral to hand surgery  Continue restricted duty  Ibuprofen as needed for pain  Discontinue aluminum splint  Follow-up 3 weeks

## 2018-08-09 NOTE — LETTER
[address],   [address] [address]  Phone:  [phone] - Fax:  [phone]   [facility] Progress Report and Disability Certification  Date of Service: 8/9/2018   No Show:  No  Date / Time of Next Visit: 8/28/2018@10:15am   Claim Information   Patient Name: Stas Martinez  Claim Number:     Employer:   [employer] Date of Injury: 6/25/2018     Insurer / TPA: [employer]  ID / SSN:     Occupation:   Diagnosis: Diagnoses of Closed nondisplaced fracture of middle phalanx of left middle finger with delayed healing, subsequent encounter and Closed dislocation of finger of left hand, subsequent encounter were pertinent to this visit.    Medical Information   Related to Industrial Injury? Yes    Subjective Complaints:  DOI: 6/25/18: 22 yo female here for a follow-up on her left middle finger fracture, dislocation and nail avulsion injury of left index finger. Her left hand got caught in torque gun drill. She was seen in , finger dislocation was reduced.  Patient states she continues to have pain at the PIP joint of the second and thrid digits.  She states that she is still unable to really move feels fingers.  Notes mild swelling.  Denies tingling or numbness.  She has been using the aluminum finger splints.  She is also been taking ibuprofen as needed for relief.   Objective Findings: Left hand: Mild swelling PIP joints of second and third digit.  Tender to palpation in the same area.  Very minimal flexion of the PIP joints.   Pre-Existing Condition(s):     Assessment:   Condition Same    Status: Additional Care Required  Permanent Disability:No    Plan:      Diagnostics:      Comments:  XR Left Hand: No acute deformity as read by me  Referral to hand therapy approved but no appointment has been scheduled  Referral to hand surgery  Continue restricted duty  Ibuprofen as needed for pain  Discontinue aluminum  splint  Follow-up 3 weeks    Disability Information   Status: Released to Restricted Duty    From:  8/9/2018  Through: 8/28/2018 Restrictions are: Temporary   Physical Restrictions   Sitting:    Standing:    Stooping:    Bending:      Squatting:    Walking:    Climbing:    Pushing:      Pulling:    Other:    Reaching Above Shoulder (L):   Reaching Above Shoulder (R):       Reaching Below Shoulder (L):    Reaching Below Shoulder (R):      Not to exceed Weight Limits   Carrying(hrs):   Weight Limit(lb):   Lifting(hrs):   Weight  Limit(lb):     Comments: Limit left hand to less than 10 lbs lift/push/pull. Otherwise limited use of left hand as tolerated.    Repetitive Actions   Hands: i.e. Fine Manipulations from Grasping:     Feet: i.e. Operating Foot Controls:     Driving / Operate Machinery:     Physician Name: Leonidas Kinsey D.O. Physician Signature: LEONIDAS Galvan D.O. e-Signature: Dr. Carlos Manuel Ramos, Medical Director   Clinic Name / Location: [address],   [address] [address] Clinic Phone Number: Dept: [phone]   Appointment Time: 1:00 Pm Visit Start Time: 12:53 PM   Check-In Time:  12:45 Pm Visit Discharge Time:  2:02pm   Original-Treating Physician or Chiropractor    Page 2-Insurer/TPA    Page 3-Employer    Page 4-Employee

## 2020-02-25 ENCOUNTER — HOSPITAL ENCOUNTER (OUTPATIENT)
Facility: MEDICAL CENTER | Age: 25
End: 2020-02-25
Attending: OBSTETRICS & GYNECOLOGY | Admitting: OBSTETRICS & GYNECOLOGY
Payer: MEDICAID

## 2020-02-25 ENCOUNTER — ANESTHESIA (OUTPATIENT)
Dept: SURGERY | Facility: MEDICAL CENTER | Age: 25
End: 2020-02-25
Payer: MEDICAID

## 2020-02-25 ENCOUNTER — ANESTHESIA EVENT (OUTPATIENT)
Dept: SURGERY | Facility: MEDICAL CENTER | Age: 25
End: 2020-02-25
Payer: MEDICAID

## 2020-02-25 VITALS
HEIGHT: 69 IN | TEMPERATURE: 97 F | RESPIRATION RATE: 20 BRPM | SYSTOLIC BLOOD PRESSURE: 103 MMHG | OXYGEN SATURATION: 98 % | WEIGHT: 187.39 LBS | DIASTOLIC BLOOD PRESSURE: 64 MMHG | HEART RATE: 75 BPM | BODY MASS INDEX: 27.76 KG/M2

## 2020-02-25 LAB
ABO GROUP BLD: NORMAL
APPEARANCE UR: CLEAR
BACTERIA #/AREA URNS HPF: ABNORMAL /HPF
BASOPHILS # BLD AUTO: 0.6 % (ref 0–1.8)
BASOPHILS # BLD: 0.03 K/UL (ref 0–0.12)
BILIRUB UR QL STRIP.AUTO: NEGATIVE
COLOR UR: YELLOW
EOSINOPHIL # BLD AUTO: 0.12 K/UL (ref 0–0.51)
EOSINOPHIL NFR BLD: 2.2 % (ref 0–6.9)
EPI CELLS #/AREA URNS HPF: NEGATIVE /HPF
ERYTHROCYTE [DISTWIDTH] IN BLOOD BY AUTOMATED COUNT: 39.5 FL (ref 35.9–50)
GLUCOSE UR STRIP.AUTO-MCNC: NEGATIVE MG/DL
HCT VFR BLD AUTO: 40.9 % (ref 37–47)
HGB BLD-MCNC: 13.7 G/DL (ref 12–16)
HYALINE CASTS #/AREA URNS LPF: ABNORMAL /LPF
IMM GRANULOCYTES # BLD AUTO: 0.02 K/UL (ref 0–0.11)
IMM GRANULOCYTES NFR BLD AUTO: 0.4 % (ref 0–0.9)
KETONES UR STRIP.AUTO-MCNC: NEGATIVE MG/DL
LEUKOCYTE ESTERASE UR QL STRIP.AUTO: NEGATIVE
LYMPHOCYTES # BLD AUTO: 1.5 K/UL (ref 1–4.8)
LYMPHOCYTES NFR BLD: 28 % (ref 22–41)
MCH RBC QN AUTO: 29 PG (ref 27–33)
MCHC RBC AUTO-ENTMCNC: 33.5 G/DL (ref 33.6–35)
MCV RBC AUTO: 86.5 FL (ref 81.4–97.8)
MICRO URNS: ABNORMAL
MONOCYTES # BLD AUTO: 0.3 K/UL (ref 0–0.85)
MONOCYTES NFR BLD AUTO: 5.6 % (ref 0–13.4)
NEUTROPHILS # BLD AUTO: 3.38 K/UL (ref 2–7.15)
NEUTROPHILS NFR BLD: 63.2 % (ref 44–72)
NITRITE UR QL STRIP.AUTO: NEGATIVE
NRBC # BLD AUTO: 0 K/UL
NRBC BLD-RTO: 0 /100 WBC
PATHOLOGY CONSULT NOTE: NORMAL
PH UR STRIP.AUTO: 5.5 [PH] (ref 5–8)
PLATELET # BLD AUTO: 199 K/UL (ref 164–446)
PMV BLD AUTO: 8.9 FL (ref 9–12.9)
PROT UR QL STRIP: NEGATIVE MG/DL
RBC # BLD AUTO: 4.73 M/UL (ref 4.2–5.4)
RBC # URNS HPF: ABNORMAL /HPF
RBC UR QL AUTO: ABNORMAL
RH BLD: NORMAL
SP GR UR STRIP.AUTO: 1.01
UROBILINOGEN UR STRIP.AUTO-MCNC: 0.2 MG/DL
WBC # BLD AUTO: 5.4 K/UL (ref 4.8–10.8)
WBC #/AREA URNS HPF: ABNORMAL /HPF

## 2020-02-25 PROCEDURE — 160002 HCHG RECOVERY MINUTES (STAT): Performed by: OBSTETRICS & GYNECOLOGY

## 2020-02-25 PROCEDURE — 160026 HCHG SURGERY MINUTES - 1ST 30 MINS LEVEL 1: Performed by: OBSTETRICS & GYNECOLOGY

## 2020-02-25 PROCEDURE — 700111 HCHG RX REV CODE 636 W/ 250 OVERRIDE (IP): Performed by: ANESTHESIOLOGY

## 2020-02-25 PROCEDURE — 160035 HCHG PACU - 1ST 60 MINS PHASE I: Performed by: OBSTETRICS & GYNECOLOGY

## 2020-02-25 PROCEDURE — 160025 RECOVERY II MINUTES (STATS): Performed by: OBSTETRICS & GYNECOLOGY

## 2020-02-25 PROCEDURE — 502587 HCHG PACK, D&C: Performed by: OBSTETRICS & GYNECOLOGY

## 2020-02-25 PROCEDURE — 700105 HCHG RX REV CODE 258: Performed by: OBSTETRICS & GYNECOLOGY

## 2020-02-25 PROCEDURE — 81001 URINALYSIS AUTO W/SCOPE: CPT

## 2020-02-25 PROCEDURE — 700101 HCHG RX REV CODE 250: Performed by: ANESTHESIOLOGY

## 2020-02-25 PROCEDURE — 160009 HCHG ANES TIME/MIN: Performed by: OBSTETRICS & GYNECOLOGY

## 2020-02-25 PROCEDURE — 160046 HCHG PACU - 1ST 60 MINS PHASE II: Performed by: OBSTETRICS & GYNECOLOGY

## 2020-02-25 PROCEDURE — 88305 TISSUE EXAM BY PATHOLOGIST: CPT

## 2020-02-25 PROCEDURE — 85025 COMPLETE CBC W/AUTO DIFF WBC: CPT

## 2020-02-25 PROCEDURE — 86900 BLOOD TYPING SEROLOGIC ABO: CPT

## 2020-02-25 PROCEDURE — 160048 HCHG OR STATISTICAL LEVEL 1-5: Performed by: OBSTETRICS & GYNECOLOGY

## 2020-02-25 PROCEDURE — 86901 BLOOD TYPING SEROLOGIC RH(D): CPT

## 2020-02-25 RX ORDER — OXYCODONE HCL 5 MG/5 ML
5 SOLUTION, ORAL ORAL
Status: DISCONTINUED | OUTPATIENT
Start: 2020-02-25 | End: 2020-02-25 | Stop reason: HOSPADM

## 2020-02-25 RX ORDER — OXYCODONE HCL 5 MG/5 ML
10 SOLUTION, ORAL ORAL
Status: DISCONTINUED | OUTPATIENT
Start: 2020-02-25 | End: 2020-02-25 | Stop reason: HOSPADM

## 2020-02-25 RX ORDER — ONDANSETRON 2 MG/ML
INJECTION INTRAMUSCULAR; INTRAVENOUS PRN
Status: DISCONTINUED | OUTPATIENT
Start: 2020-02-25 | End: 2020-02-25 | Stop reason: SURG

## 2020-02-25 RX ORDER — DIPHENHYDRAMINE HYDROCHLORIDE 50 MG/ML
12.5 INJECTION INTRAMUSCULAR; INTRAVENOUS
Status: DISCONTINUED | OUTPATIENT
Start: 2020-02-25 | End: 2020-02-25 | Stop reason: HOSPADM

## 2020-02-25 RX ORDER — MIDAZOLAM HYDROCHLORIDE 1 MG/ML
INJECTION INTRAMUSCULAR; INTRAVENOUS PRN
Status: DISCONTINUED | OUTPATIENT
Start: 2020-02-25 | End: 2020-02-25 | Stop reason: SURG

## 2020-02-25 RX ORDER — OXYTOCIN 10 [USP'U]/ML
INJECTION, SOLUTION INTRAMUSCULAR; INTRAVENOUS
Status: DISCONTINUED
Start: 2020-02-25 | End: 2020-02-25 | Stop reason: HOSPADM

## 2020-02-25 RX ORDER — METHYLERGONOVINE MALEATE 0.2 MG/ML
INJECTION INTRAVENOUS
Status: DISCONTINUED
Start: 2020-02-25 | End: 2020-02-25 | Stop reason: HOSPADM

## 2020-02-25 RX ORDER — HYDROMORPHONE HYDROCHLORIDE 1 MG/ML
0.1 INJECTION, SOLUTION INTRAMUSCULAR; INTRAVENOUS; SUBCUTANEOUS
Status: DISCONTINUED | OUTPATIENT
Start: 2020-02-25 | End: 2020-02-25 | Stop reason: HOSPADM

## 2020-02-25 RX ORDER — LIDOCAINE HYDROCHLORIDE 20 MG/ML
INJECTION, SOLUTION EPIDURAL; INFILTRATION; INTRACAUDAL; PERINEURAL PRN
Status: DISCONTINUED | OUTPATIENT
Start: 2020-02-25 | End: 2020-02-25 | Stop reason: SURG

## 2020-02-25 RX ORDER — ONDANSETRON 2 MG/ML
4 INJECTION INTRAMUSCULAR; INTRAVENOUS
Status: DISCONTINUED | OUTPATIENT
Start: 2020-02-25 | End: 2020-02-25 | Stop reason: HOSPADM

## 2020-02-25 RX ORDER — HYDROMORPHONE HYDROCHLORIDE 1 MG/ML
0.4 INJECTION, SOLUTION INTRAMUSCULAR; INTRAVENOUS; SUBCUTANEOUS
Status: DISCONTINUED | OUTPATIENT
Start: 2020-02-25 | End: 2020-02-25 | Stop reason: HOSPADM

## 2020-02-25 RX ORDER — DEXAMETHASONE SODIUM PHOSPHATE 4 MG/ML
INJECTION, SOLUTION INTRA-ARTICULAR; INTRALESIONAL; INTRAMUSCULAR; INTRAVENOUS; SOFT TISSUE PRN
Status: DISCONTINUED | OUTPATIENT
Start: 2020-02-25 | End: 2020-02-25 | Stop reason: SURG

## 2020-02-25 RX ORDER — KETOROLAC TROMETHAMINE 30 MG/ML
INJECTION, SOLUTION INTRAMUSCULAR; INTRAVENOUS PRN
Status: DISCONTINUED | OUTPATIENT
Start: 2020-02-25 | End: 2020-02-25 | Stop reason: SURG

## 2020-02-25 RX ORDER — HYDRALAZINE HYDROCHLORIDE 20 MG/ML
5 INJECTION INTRAMUSCULAR; INTRAVENOUS
Status: DISCONTINUED | OUTPATIENT
Start: 2020-02-25 | End: 2020-02-25 | Stop reason: HOSPADM

## 2020-02-25 RX ORDER — MISOPROSTOL 200 UG/1
TABLET ORAL
Status: DISCONTINUED
Start: 2020-02-25 | End: 2020-02-25 | Stop reason: HOSPADM

## 2020-02-25 RX ORDER — MEPERIDINE HYDROCHLORIDE 25 MG/ML
12.5 INJECTION INTRAMUSCULAR; INTRAVENOUS; SUBCUTANEOUS
Status: DISCONTINUED | OUTPATIENT
Start: 2020-02-25 | End: 2020-02-25 | Stop reason: HOSPADM

## 2020-02-25 RX ORDER — IBUPROFEN 600 MG/1
600 TABLET ORAL EVERY 6 HOURS PRN
Qty: 30 TAB | Refills: 1 | Status: SHIPPED | OUTPATIENT
Start: 2020-02-25

## 2020-02-25 RX ORDER — IBUPROFEN 600 MG/1
600 TABLET ORAL EVERY 6 HOURS PRN
Qty: 30 TAB | Refills: 1 | Status: SHIPPED | OUTPATIENT
Start: 2020-02-25 | End: 2020-02-25 | Stop reason: SDUPTHER

## 2020-02-25 RX ORDER — SODIUM CHLORIDE, SODIUM LACTATE, POTASSIUM CHLORIDE, CALCIUM CHLORIDE 600; 310; 30; 20 MG/100ML; MG/100ML; MG/100ML; MG/100ML
INJECTION, SOLUTION INTRAVENOUS CONTINUOUS
Status: DISCONTINUED | OUTPATIENT
Start: 2020-02-25 | End: 2020-02-25 | Stop reason: HOSPADM

## 2020-02-25 RX ORDER — HALOPERIDOL 5 MG/ML
1 INJECTION INTRAMUSCULAR
Status: DISCONTINUED | OUTPATIENT
Start: 2020-02-25 | End: 2020-02-25 | Stop reason: HOSPADM

## 2020-02-25 RX ORDER — HYDROMORPHONE HYDROCHLORIDE 1 MG/ML
0.2 INJECTION, SOLUTION INTRAMUSCULAR; INTRAVENOUS; SUBCUTANEOUS
Status: DISCONTINUED | OUTPATIENT
Start: 2020-02-25 | End: 2020-02-25 | Stop reason: HOSPADM

## 2020-02-25 RX ADMIN — SODIUM CHLORIDE, POTASSIUM CHLORIDE, SODIUM LACTATE AND CALCIUM CHLORIDE: 600; 310; 30; 20 INJECTION, SOLUTION INTRAVENOUS at 09:12

## 2020-02-25 RX ADMIN — ONDANSETRON 4 MG: 2 INJECTION INTRAMUSCULAR; INTRAVENOUS at 10:11

## 2020-02-25 RX ADMIN — KETOROLAC TROMETHAMINE 30 MG: 30 INJECTION, SOLUTION INTRAMUSCULAR at 10:10

## 2020-02-25 RX ADMIN — PROPOFOL 150 MG: 10 INJECTION, EMULSION INTRAVENOUS at 09:53

## 2020-02-25 RX ADMIN — FENTANYL CITRATE 50 MCG: 50 INJECTION, SOLUTION INTRAMUSCULAR; INTRAVENOUS at 09:53

## 2020-02-25 RX ADMIN — FENTANYL CITRATE 50 MCG: 50 INJECTION, SOLUTION INTRAMUSCULAR; INTRAVENOUS at 10:10

## 2020-02-25 RX ADMIN — DEXAMETHASONE SODIUM PHOSPHATE 8 MG: 4 INJECTION, SOLUTION INTRA-ARTICULAR; INTRALESIONAL; INTRAMUSCULAR; INTRAVENOUS; SOFT TISSUE at 09:57

## 2020-02-25 RX ADMIN — MIDAZOLAM HYDROCHLORIDE 2 MG: 1 INJECTION, SOLUTION INTRAMUSCULAR; INTRAVENOUS at 09:48

## 2020-02-25 RX ADMIN — FENTANYL CITRATE 50 MCG: 50 INJECTION, SOLUTION INTRAMUSCULAR; INTRAVENOUS at 10:00

## 2020-02-25 RX ADMIN — LIDOCAINE HYDROCHLORIDE 40 MG: 20 INJECTION, SOLUTION EPIDURAL; INFILTRATION; INTRACAUDAL at 09:53

## 2020-02-25 ASSESSMENT — PAIN SCALES - GENERAL: PAIN_LEVEL: 0

## 2020-02-25 NOTE — OP REPORT
DATE OF SERVICE:  2020    INDICATIONS:  The patient is a 24-year-old  3, para 2-0-1 with 1   surrogacy pregnancy with missed  at 10 weeks of gestational age,   gestational sac is 5-6 weeks and falling beta-hCG.    PREOPERATIVE DIAGNOSIS:  Missed .    POSTOPERATIVE DIAGNOSIS:  Missed .    PROCEDURE PERFORMED:  Dilatation and curettage.    SURGEON:  Ozzy Gamboa MD    ANESTHESIOLOGIST:  Dr. Fowler.    ANESTHESIA:  General LMA mask.    DESCRIPTION OF PROCEDURE:  Patient was consented and taken to the operating   room.  General anesthesia was induced with LMA mask.  Patient was prepped and   draped in the normal sterile fashion, was positioned in dorsal lithotomy   position.  Bladder was straight catheterized, 400 mL of clear urine was   drained.  Pelvic examination, uterus anteverted and bulky.  A self-retaining   speculum was introduced and held in place.  Anterior lip of the cervix was   held with tenaculum.  Cervix was sounded to 8 cm, serially dilated to #8   Hegar's dilator, 8 curved cannula was introduced and suction with the machine   was started.  Products of conception were evacuated.  Cannula was removed.  A   sharp curettage was done and grating sensation was felt in all around the   walls.  Suction cannula was introduced and all the debris was suctioned out.    No active bleeding was noted.  Tenaculum was removed.  No active bleeding was   noted.  Patient was extubated and transferred to the recovery room under   stable condition.    ESTIMATED BLOOD LOSS:  50 mL.       ____________________________________     MD LINDSEY Lewis / GEORGINA    DD:  2020 10:14:18  DT:  2020 10:38:12    D#:  [id number]  Job#:  [id number]

## 2020-02-25 NOTE — ANESTHESIA POSTPROCEDURE EVALUATION
Patient: Stas Martinez    Procedure Summary     Date:  20 Room / Location:  [facility] ROOM 21 / SURGERY SAME DAY [facility]    Anesthesia Start:  948 Anesthesia Stop:      Procedure:  DILATION AND EVACUATION, UTERUS (N/A Uterus) Diagnosis:  (MISSED )    Surgeon:  Ozzy Gamboa M.D. Responsible Provider:  Timothy Fowler M.D.    Anesthesia Type:  general ASA Status:  1          Final Anesthesia Type: general  Last vitals  BP   Blood Pressure: 103/64, NIBP: (!) 96/55    Temp   36.1 °C (97 °F)    Pulse   Pulse: 75   Resp   20    SpO2   98 %      Anesthesia Post Evaluation    Patient location during evaluation: PACU  Patient participation: complete - patient participated  Level of consciousness: awake and alert  Pain score: 0    Airway patency: patent  Anesthetic complications: no  Cardiovascular status: hemodynamically stable  Respiratory status: acceptable  Hydration status: euvolemic    PONV: none           Nurse Pain Score: 0 (NPRS)

## 2020-02-25 NOTE — OR NURSING
1021 Pt to PACU from OR. Report from anesthesia and OR RN. OA in place, on 6L blow by O2. Respirations even and unlabored. VSS. No mat pad bleeding.     1031 Pt waking at this time, dc'd OA. Respirations even and unlabored. Declines pain/nausea.    1044  at bedside, drinking cranberry juice.     1106 On RA, meets phase II criteria.    1115 Report to Grace DENG RN, for break coverage.

## 2020-02-25 NOTE — OR SURGEON
Immediate Post OP Note    PreOp Diagnosis: missed .    PostOp Diagnosis: same.    Procedure(s):  DILATION AND EVACUATION, UTERUS - Wound Class: Clean Contaminated    Surgeon(s):  Ozzy Gamboa M.D.    Anesthesiologist/Type of Anesthesia:  Anesthesiologist: Timothy Fowler M.D./General    Surgical Staff:  Circulator: Carole Mark RRAND; Frances Portillo R.N.  Scrub Person: Irene Pearce    Specimens removed if any:  Products of conception.    Estimated Blood Loss: 50 ml.    Findings: uterus bulky anteverted sounded 8 cms.    Complications: none.        2020 10:11 AM Ozzy Gamboa M.D.

## 2020-02-25 NOTE — DISCHARGE INSTRUCTIONS
ACTIVITY: Rest and take it easy for the first 24 hours.  A responsible adult is recommended to remain with you during that time.  It is normal to feel sleepy.  We encourage you to not do anything that requires balance, judgment or coordination.    MILD FLU-LIKE SYMPTOMS ARE NORMAL. YOU MAY EXPERIENCE GENERALIZED MUSCLE ACHES, THROAT IRRITATION, HEADACHE AND/OR SOME NAUSEA.    FOR 24 HOURS DO NOT:  Drive, operate machinery or run household appliances.  Drink beer or alcoholic beverages.   Make important decisions or sign legal documents.    SPECIAL INSTRUCTIONS:     1. Pelvic rest for 6 weeks.     2. Follow up appointment x 2 weeks at The  [facility] .    DIET: To avoid nausea, slowly advance diet as tolerated, avoiding spicy or greasy foods for the first day.  Add more substantial food to your diet according to your physician's instructions.  Babies can be fed formula or breast milk as soon as they are hungry.  INCREASE FLUIDS AND FIBER TO AVOID CONSTIPATION.    SURGICAL DRESSING/BATHING: Ok to shower tomorrowl, do not sit in bath tub, hot tub, swimming pools until cleared by physician.    FOLLOW-UP APPOINTMENT:  A follow-up appointment should be arranged with your doctor in 2 weeks; call to schedule.    You should CALL YOUR PHYSICIAN if you develop:  Fever greater than 101 degrees F.  Pain not relieved by medication, or persistent nausea or vomiting.  Excessive bleeding (blood soaking through dressing) or unexpected drainage from the wound.  Extreme redness or swelling around the incision site, drainage of pus or foul smelling drainage.  Inability to urinate or empty your bladder within 8 hours.  Problems with breathing or chest pain.    You should call 911 if you develop problems with breathing or chest pain.  If you are unable to contact your doctor or surgical center, you should go to the nearest emergency room or urgent care center.  Physician's telephone #: Dr. Gamboa [phone]    If any  questions arise, call your doctor.  If your doctor is not available, please feel free to call the Surgical Center at [phone].  The Center is open Monday through Friday from 7AM to 7PM.  You can also call the HEALTH HOTLINE open 24 hours/day, 7 days/week and speak to a nurse at [phone], or toll free at [phone].    A registered nurse may call you a few days after your surgery to see how you are doing after your procedure.    MEDICATIONS: Resume taking daily medication.  Take prescribed pain medication with food.  If no medication is prescribed, you may take non-aspirin pain medication if needed.  PAIN MEDICATION CAN BE VERY CONSTIPATING.  Take a stool softener or laxative such as senokot, pericolace, or milk of magnesia if needed.    Prescription given for Ibuprofen.  Last pain medication given at 10:10 am (Toradol-IV NSAID), wait until 4:10 pm before starting Ibuprofen.    If your physician has prescribed pain medication that includes Acetaminophen (Tylenol), do not take additional Acetaminophen (Tylenol) while taking the prescribed medication.    Depression / Suicide Risk    As you are discharged from this [facility] facility, it is important to learn how to keep safe from harming yourself.    Recognize the warning signs:  · Abrupt changes in personality, positive or negative- including increase in energy   · Giving away possessions  · Change in eating patterns- significant weight changes-  positive or negative  · Change in sleeping patterns- unable to sleep or sleeping all the time   · Unwillingness or inability to communicate  · Depression  · Unusual sadness, discouragement and loneliness  · Talk of wanting to die  · Neglect of personal appearance   · Rebelliousness- reckless behavior  · Withdrawal from people/activities they love  · Confusion- inability to concentrate     If you or a loved one observes any of these behaviors or has concerns about self-harm, here's what you can do:  · Talk  about it- your feelings and reasons for harming yourself  · Remove any means that you might use to hurt yourself (examples: pills, rope, extension cords, firearm)  · Get professional help from the community (Mental Health, Substance Abuse, psychological counseling)  · Do not be alone:Call your Safe Contact- someone whom you trust who will be there for you.  · Call your local CRISIS HOTLINE 140-7279 or 243-741-2334  · Call your local Children's Mobile Crisis Response Team [place] (508) 750-9464 or wwwSimpleDeal  · Call the toll free National Suicide Prevention Hotlines   · National Suicide Prevention Lifeline 659-661-IAVZ (7331)  National Hope Line Network 800-SUICIDE (844-4280)

## 2020-02-25 NOTE — ANESTHESIA PROCEDURE NOTES
Airway  Date/Time: 2/25/2020 9:54 AM  Performed by: Timothy Fowler M.D.  Authorized by: Timothy Fowler M.D.     Location:  OR  Urgency:  Elective  Indications for Airway Management:  Anesthesia  Spontaneous Ventilation: absent    Sedation Level:  Deep  Preoxygenated: Yes    Mask Difficulty Assessment:  0 - not attempted  Final Airway Type:  Supraglottic airway  Final Supraglottic Airway:  Standard LMA  SGA Size:  4  Number of Attempts at Approach:  1

## 2020-02-25 NOTE — ANESTHESIA TIME REPORT
Anesthesia Start and Stop Event Times     Date Time Event    2020 0937 Ready for Procedure     0948 Anesthesia Start     1023 Anesthesia Stop        Responsible Staff  20    Name Role Begin End    Timothy Fowler M.D. Anesth 0948 1023        Preop Diagnosis (Free Text):  Pre-op Diagnosis     MISSED         Preop Diagnosis (Codes):    Post op Diagnosis  Missed       Premium Reason  Non-Premium    Comments:

## 2020-02-25 NOTE — ANESTHESIA QCDR
2019 [facility] Clinical Data Registry (for Quality Improvement)     Postoperative nausea/vomiting risk protocol (Adult = 18 yrs and Pediatric 3-17 yrs)- (430 and 463)  General inhalation anesthetic (NOT TIVA) with PONV risk factors: Yes  Provision of anti-emetic therapy with at least 2 different classes of agents: Yes   Patient DID NOT receive anti-emetic therapy and reason is documented in Medical Record:  N/A    Multimodal Pain Management- (477)  Non-emergent surgery AND patient age >= 18: Yes  Use of Multimodal Pain Management, two or more drugs and/or interventions, NOT including systemic opioids: No  Exception: Documented allergy to multiple classes of analgesics: No       Smoking Abstinence (404)  Patient is current smoker (cigarette, pipe, e-cig, marijuanna): No  Elective Surgery:   Abstinence instructions provided prior to day of surgery:   Patient abstained from smoking on day of surgery:     Pre-Op Beta-Blocker in Isolated CABG (44)  Isolated CABG AND patient age >= 18: No  Beta-blocker admin within 24 hours of surgical incision:   Exception:of medical reason(s) for not administering beta blocker within 24 hours prior to surgical incision (e.g., not  indicated,other medical reason):     PACU assessment of acute postoperative pain prior to Anesthesia Care End- Applies to Patients Age = 18- (ABG7)  Initial PACU pain score is which of the following: < 7/10  Patient unable to report pain score: N/A    Post-anesthetic transfer of care checklist/protocol to PACU/ICU- (426 and 427)  Upon conclusion of case, patient transferred to which of the following locations: PACU/Non-ICU  Use of transfer checklist/protocol: Yes  Exclusion: Service Performed in Patient Hospital Room (and thus did not require transfer): N/A  Unplanned admission to ICU related to anesthesia service up through end of PACU care- (MD51)  Unplanned admission to ICU (not initially anticipated at anesthesia start time): No

## 2020-02-25 NOTE — OR NURSING
1117 assumed care of pt for rn break   Pt up to bathroom to void dressed in bathroom bleeding scant to cali pad     1136-Reviewed all dc instructions with verbal understanding. Pt dressed and Iv dcd. Dc to home with all belongings

## 2023-04-12 ENCOUNTER — NON-PROVIDER VISIT (OUTPATIENT)
Dept: OCCUPATIONAL MEDICINE | Facility: CLINIC | Age: 28
End: 2023-04-12

## 2023-04-12 DIAGNOSIS — Z02.1 PRE-EMPLOYMENT DRUG SCREENING: ICD-10-CM

## 2023-04-12 DIAGNOSIS — Z02.1 PRE-EMPLOYMENT HEALTH SCREENING EXAMINATION: ICD-10-CM

## 2023-04-12 LAB
AMP AMPHETAMINE: NORMAL
COC COCAINE: NORMAL
INT CON NEG: NORMAL
INT CON POS: NORMAL
MET METHAMPHETAMINES: NORMAL
OPI OPIATES: NORMAL
PCP PHENCYCLIDINE: NORMAL
POC DRUG COMMENT 753798-OCCUPATIONAL HEALTH: NEGATIVE
THC: NORMAL

## 2023-04-12 PROCEDURE — 80305 DRUG TEST PRSMV DIR OPT OBS: CPT | Performed by: PREVENTIVE MEDICINE

## (undated) DEVICE — KIT D & C COLLECTION (10EA/PK)

## (undated) DEVICE — MASK ANESTHESIA ADULT  - (100/CA)

## (undated) DEVICE — TUBE CONNECTING SUCTION - CLEAR PLASTIC STERILE 72 IN (50EA/CA)

## (undated) DEVICE — SENSOR SPO2 NEO LNCS ADHESIVE (20/BX) SEE USER NOTES

## (undated) DEVICE — TRAY SRGPRP PVP IOD WT PRP - (20/CA)

## (undated) DEVICE — GLOVE BIOGEL INDICATOR SZ 7.5 SURGICAL PF LTX - (50PR/BX 4BX/CA)

## (undated) DEVICE — NEPTUNE 4 PORT MANIFOLD - (20/PK)

## (undated) DEVICE — LACTATED RINGERS INJ 1000 ML - (14EA/CA 60CA/PF)

## (undated) DEVICE — HEAD HOLDER JUNIOR/ADULT

## (undated) DEVICE — TUBING D & E COLLECTION SET (50EA/PK)

## (undated) DEVICE — SET LEADWIRE 5 LEAD BEDSIDE DISPOSABLE ECG (1SET OF 5/EA)

## (undated) DEVICE — TUBING CLEARLINK DUO-VENT - C-FLO (48EA/CA)

## (undated) DEVICE — PROTECTOR ULNA NERVE - (36PR/CA)

## (undated) DEVICE — ELECTRODE 850 FOAM ADHESIVE - HYDROGEL RADIOTRNSPRNT (50/PK)

## (undated) DEVICE — GLOVE BIOGEL SZ 6.5 SURGICAL PF LTX (50PR/BX 4BX/CA)

## (undated) DEVICE — VACURETTE 8MM CURVED 10/PKG

## (undated) DEVICE — CATHETER IV 20 GA X 1-1/4 ---SURG.& SDS ONLY--- (50EA/BX)

## (undated) DEVICE — GLOVE BIOGEL SZ 7.5 SURGICAL PF LTX - (50PR/BX 4BX/CA)

## (undated) DEVICE — CANISTER SUCTION RIGID RED 1500CC (40EA/CA)

## (undated) DEVICE — KIT  I.V. START (100EA/CA)

## (undated) DEVICE — SODIUM CHL IRRIGATION 0.9% 1000ML (12EA/CA)

## (undated) DEVICE — Device

## (undated) DEVICE — SET EXTENSION WITH 2 PORTS (48EA/CA) ***PART #2C8610 IS A SUBSTITUTE*****

## (undated) DEVICE — KIT ANESTHESIA W/CIRCUIT & 3/LT BAG W/FILTER (20EA/CA)

## (undated) DEVICE — ELECTRODE DUAL RETURN W/ CORD - (50/PK)

## (undated) DEVICE — CANISTER SUCTION 3000ML MECHANICAL FILTER AUTO SHUTOFF MEDI-VAC NONSTERILE LF DISP  (40EA/CA)

## (undated) DEVICE — PAD SANITARY 11IN MAXI IND WRAPPED  (12EA/PK 24PK/CA)

## (undated) DEVICE — SUCTION INSTRUMENT YANKAUER BULBOUS TIP W/O VENT (50EA/CA)